# Patient Record
Sex: MALE | Race: WHITE | NOT HISPANIC OR LATINO | URBAN - METROPOLITAN AREA
[De-identification: names, ages, dates, MRNs, and addresses within clinical notes are randomized per-mention and may not be internally consistent; named-entity substitution may affect disease eponyms.]

---

## 2021-09-17 DIAGNOSIS — N40.0 BPH (BENIGN PROSTATIC HYPERPLASIA): Primary | ICD-10-CM

## 2021-09-21 ENCOUNTER — OFFICE VISIT (OUTPATIENT)
Dept: UROLOGY | Facility: CLINIC | Age: 71
End: 2021-09-21
Payer: MEDICARE

## 2021-09-21 VITALS — WEIGHT: 201 LBS | RESPIRATION RATE: 18 BRPM | HEIGHT: 75 IN | BODY MASS INDEX: 24.99 KG/M2

## 2021-09-21 DIAGNOSIS — Z80.42 FAMILY HISTORY OF PROSTATE CANCER IN FATHER: ICD-10-CM

## 2021-09-21 DIAGNOSIS — R39.11 BENIGN PROSTATIC HYPERPLASIA WITH URINARY HESITANCY: Primary | ICD-10-CM

## 2021-09-21 DIAGNOSIS — N40.1 BENIGN PROSTATIC HYPERPLASIA WITH URINARY HESITANCY: Primary | ICD-10-CM

## 2021-09-21 LAB — POC RESIDUAL URINE VOLUME: 35 ML (ref 0–100)

## 2021-09-21 PROCEDURE — 51798 POCT BLADDER SCAN: ICD-10-PCS | Mod: S$GLB,,, | Performed by: UROLOGY

## 2021-09-21 PROCEDURE — 99203 PR OFFICE/OUTPT VISIT, NEW, LEVL III, 30-44 MIN: ICD-10-PCS | Mod: S$GLB,,, | Performed by: UROLOGY

## 2021-09-21 PROCEDURE — 51798 US URINE CAPACITY MEASURE: CPT | Mod: S$GLB,,, | Performed by: UROLOGY

## 2021-09-21 PROCEDURE — 99203 OFFICE O/P NEW LOW 30 MIN: CPT | Mod: S$GLB,,, | Performed by: UROLOGY

## 2021-09-21 RX ORDER — UBIDECARENONE 30 MG
30 CAPSULE ORAL 3 TIMES DAILY
COMMUNITY

## 2021-09-21 RX ORDER — PANTOPRAZOLE SODIUM 40 MG/1
40 TABLET, DELAYED RELEASE ORAL DAILY
COMMUNITY
Start: 2021-07-03

## 2021-09-21 RX ORDER — TAMSULOSIN HYDROCHLORIDE 0.4 MG/1
1 CAPSULE ORAL DAILY
COMMUNITY
Start: 2021-09-08

## 2021-09-21 RX ORDER — DUTASTERIDE 0.5 MG/1
0.5 CAPSULE, LIQUID FILLED ORAL DAILY
COMMUNITY
Start: 2021-09-15 | End: 2021-09-21

## 2021-09-21 RX ORDER — AMOXICILLIN AND CLAVULANATE POTASSIUM 875; 125 MG/1; MG/1
1 TABLET, FILM COATED ORAL 2 TIMES DAILY
COMMUNITY
Start: 2021-09-14 | End: 2023-04-27

## 2021-09-21 RX ORDER — LEVOTHYROXINE SODIUM 25 UG/1
25 TABLET ORAL DAILY
COMMUNITY
Start: 2021-07-03

## 2021-09-21 RX ORDER — MULTIVITAMIN
1 TABLET ORAL DAILY
COMMUNITY

## 2022-03-25 ENCOUNTER — TELEPHONE (OUTPATIENT)
Dept: UROLOGY | Facility: CLINIC | Age: 72
End: 2022-03-25
Payer: MEDICARE

## 2022-03-25 NOTE — TELEPHONE ENCOUNTER
----- Message from Jaja Jernigan sent at 3/25/2022  8:14 AM CDT -----  Pt requests a call back to change lab work appt., Pt will elaborate , please call .[phone] (tbyw)

## 2022-04-04 ENCOUNTER — CLINICAL SUPPORT (OUTPATIENT)
Dept: UROLOGY | Facility: CLINIC | Age: 72
End: 2022-04-04
Payer: MEDICARE

## 2022-04-04 DIAGNOSIS — R39.11 BENIGN PROSTATIC HYPERPLASIA WITH URINARY HESITANCY: Primary | ICD-10-CM

## 2022-04-04 DIAGNOSIS — N40.1 BENIGN PROSTATIC HYPERPLASIA WITH URINARY HESITANCY: Primary | ICD-10-CM

## 2022-04-04 LAB — PSA, DIAGNOSTIC: 1.88 NG/ML (ref 0–4)

## 2022-04-07 ENCOUNTER — OFFICE VISIT (OUTPATIENT)
Dept: UROLOGY | Facility: CLINIC | Age: 72
End: 2022-04-07
Payer: MEDICARE

## 2022-04-07 VITALS
HEART RATE: 77 BPM | HEIGHT: 75 IN | BODY MASS INDEX: 24.99 KG/M2 | DIASTOLIC BLOOD PRESSURE: 67 MMHG | WEIGHT: 201 LBS | SYSTOLIC BLOOD PRESSURE: 142 MMHG | TEMPERATURE: 99 F

## 2022-04-07 DIAGNOSIS — Z12.5 PROSTATE CANCER SCREENING: Primary | ICD-10-CM

## 2022-04-07 PROCEDURE — 99214 OFFICE O/P EST MOD 30 MIN: CPT | Mod: S$GLB,,, | Performed by: UROLOGY

## 2022-04-07 PROCEDURE — 99214 PR OFFICE/OUTPT VISIT, EST, LEVL IV, 30-39 MIN: ICD-10-PCS | Mod: S$GLB,,, | Performed by: UROLOGY

## 2022-04-07 NOTE — PROGRESS NOTES
Subjective:       Patient ID: Sanjuanita Morris is a 71 y.o. male.    Chief Complaint: Other (PSA RESULTS)      HPI: 71 year old  male who presents for establishment of care for evaluation of BPH. He experienced a fall which preceded his urinary symptoms. He reported lower abdominal pain and rectal pain, he also was experiencing hesitancy, frequency, and urgency. He was placed on 2 antibiotics which he completed as prescribed. He underwent CT imaging which showed prostate enlargement. He was then started on tamsulosin/dutasteride. He presents today and states that symptoms have improved. He was not having any urological symptoms prior to the fall.        he is here for a 6 month PSA and states that he is doing much better his PSA at age 71 is 1.88  He does have a family history of prostate cancer in his father. His father  of prostate cancer per patient report. He was 85 years old at the time of death and his PSA resulted as high as the 100s.     He has a recent PSA resulted at 2.3 ng/mL when his urinary symptoms started. His normal PSA ranges around 0.8-1.4 ng/mL per his report.       He denies any urinary complaints today such as burning with urination or hematuria.    Past Medical History:   Past Medical History:   Diagnosis Date    Actinic keratosis     BPH with obstruction/lower urinary tract symptoms     Cervical disc disorder with radiculopathy     Dermatitis     Diastasis of muscle     Diverticulitis     ED (erectile dysfunction)     Family history of prostate cancer in father     Fracture of metatarsal     GERD (gastroesophageal reflux disease)     Hemangioma     History of UTI     HLD (hyperlipidemia)     Hypothyroidism     Osteoarthritis     Sciatica     Seborrheic dermatitis     Sleep apnea     Solitary pulmonary nodule     Sprain of rotator cuff     Superior glenoid labrum lesion of left shoulder        Past Surgical Historical:   Past Surgical History:   Procedure  Laterality Date    ANKLE SURGERY      COLONOSCOPY  2019    LEG SURGERY          Medications:   Medication List with Changes/Refills   Current Medications    AMOXICILLIN-CLAVULANATE 875-125MG (AUGMENTIN) 875-125 MG PER TABLET    Take 1 tablet by mouth 2 (two) times daily.    CO-ENZYME Q-10 30 MG CAPSULE    Take 30 mg by mouth 3 (three) times daily.    ERGOCALCIFEROL, VITAMIN D2, (VITAMIN D ORAL)    Take by mouth.    LEVOTHYROXINE (SYNTHROID) 25 MCG TABLET    Take 25 mcg by mouth once daily.    MULTIVITAMIN (ONE DAILY MULTIVITAMIN) PER TABLET    Take 1 tablet by mouth once daily.    PANTOPRAZOLE (PROTONIX) 40 MG TABLET    Take 40 mg by mouth once daily.    TAMSULOSIN (FLOMAX) 0.4 MG CAP    Take 1 capsule by mouth once daily.    ZINC ORAL    Take by mouth.        Past Social History:   Social History     Socioeconomic History    Marital status:    Tobacco Use    Smoking status: Never Smoker    Smokeless tobacco: Never Used   Substance and Sexual Activity    Alcohol use: Yes       Allergies:   Review of patient's allergies indicates:   Allergen Reactions    Aminoglycosides      -mycins        Family History:   Family History   Problem Relation Age of Onset    Prostate cancer Father     COPD Mother         Review of Systems:  Review of Systems    Physical Exam:  Physical Exam    Assessment/Plan:       Problem List Items Addressed This Visit    None             prostate cancer screening:  Patient's PSA is 1.88 is doing very well has no issues return to clinic as needed

## 2023-04-03 ENCOUNTER — TELEPHONE (OUTPATIENT)
Dept: UROLOGY | Facility: CLINIC | Age: 73
End: 2023-04-03
Payer: MEDICARE

## 2023-04-03 NOTE — TELEPHONE ENCOUNTER
Elsy rescheduled.     ----- Message from Devorah Ceja sent at 4/3/2023 10:14 AM CDT -----  Contact: pt  Pt calling to reschedule appt for 4/10/1023 and he can be reached at [phone].    Thanks,

## 2023-04-14 ENCOUNTER — TELEPHONE (OUTPATIENT)
Dept: UROLOGY | Facility: CLINIC | Age: 73
End: 2023-04-14
Payer: MEDICARE

## 2023-04-14 DIAGNOSIS — R39.11 BENIGN PROSTATIC HYPERPLASIA WITH URINARY HESITANCY: ICD-10-CM

## 2023-04-14 DIAGNOSIS — N40.1 BENIGN PROSTATIC HYPERPLASIA WITH URINARY HESITANCY: ICD-10-CM

## 2023-04-14 DIAGNOSIS — Z12.5 PROSTATE CANCER SCREENING: Primary | ICD-10-CM

## 2023-04-14 NOTE — TELEPHONE ENCOUNTER
----- Message from Peggy Browne sent at 4/14/2023  9:17 AM CDT -----  Contact: self  Type:  Patient Returning Call    Who Called:Sanjuanita Morris   Who Left Message for Patient: Sofia Das  Does the patient know what this is regarding?: Yes  Would the patient rather a call back or a response via [facility]? Call back   Best Call Back Number:[phone]   Additional Information: I tried the clinic ext, but no answer

## 2023-04-14 NOTE — TELEPHONE ENCOUNTER
----- Message from Cheri Diana sent at 4/14/2023  8:26 AM CDT -----  Regarding: pt advice  Contact: pt  Pt is calling to speak to nurse regarding his appt for the lab draw. Please call back at [phone]//thank you acc

## 2023-04-14 NOTE — TELEPHONE ENCOUNTER
Pt would like to have his blood drawn at the clinical pathology lab in [place] since where he lives. Order faxed [phone]

## 2023-04-26 ENCOUNTER — TELEPHONE (OUTPATIENT)
Dept: UROLOGY | Facility: CLINIC | Age: 73
End: 2023-04-26
Payer: MEDICARE

## 2023-04-26 NOTE — TELEPHONE ENCOUNTER
----- Message from Elizabeth Proctor sent at 4/26/2023 11:38 AM CDT -----  Patient returning call......

## 2023-04-27 ENCOUNTER — OFFICE VISIT (OUTPATIENT)
Dept: UROLOGY | Facility: CLINIC | Age: 73
End: 2023-04-27
Payer: MEDICARE

## 2023-04-27 DIAGNOSIS — Z12.5 PROSTATE CANCER SCREENING: ICD-10-CM

## 2023-04-27 DIAGNOSIS — N52.9 ERECTILE DYSFUNCTION, UNSPECIFIED ERECTILE DYSFUNCTION TYPE: ICD-10-CM

## 2023-04-27 DIAGNOSIS — N13.8 BPH WITH OBSTRUCTION/LOWER URINARY TRACT SYMPTOMS: Primary | ICD-10-CM

## 2023-04-27 DIAGNOSIS — N40.1 BPH WITH OBSTRUCTION/LOWER URINARY TRACT SYMPTOMS: Primary | ICD-10-CM

## 2023-04-27 PROCEDURE — 99214 PR OFFICE/OUTPT VISIT, EST, LEVL IV, 30-39 MIN: ICD-10-PCS | Mod: S$GLB,,, | Performed by: UROLOGY

## 2023-04-27 PROCEDURE — 99214 OFFICE O/P EST MOD 30 MIN: CPT | Mod: S$GLB,,, | Performed by: UROLOGY

## 2023-04-27 NOTE — PROGRESS NOTES
Subjective:       Patient ID: Sanjuanita Morris is a 72 y.o. male.    Chief Complaint: 6 month psa results      HPI:  72-year-old male here for prostate cancer screening PSA is 1.88 patient also has erectile dysfunction does have a family history of prostate cancer in his father.  Father  with a PSA in the 600s.  Patient also has erectile dysfunction for which he uses Viagra    Past Medical History:   Past Medical History:   Diagnosis Date    Actinic keratosis     BPH with obstruction/lower urinary tract symptoms     Cervical disc disorder with radiculopathy     Dermatitis     Diastasis of muscle     Diverticulitis     ED (erectile dysfunction)     Family history of prostate cancer in father     Fracture of metatarsal     GERD (gastroesophageal reflux disease)     Hemangioma     History of UTI     HLD (hyperlipidemia)     Hypothyroidism     Osteoarthritis     Sciatica     Seborrheic dermatitis     Sleep apnea     Solitary pulmonary nodule     Sprain of rotator cuff     Superior glenoid labrum lesion of left shoulder        Past Surgical Historical:   Past Surgical History:   Procedure Laterality Date    ANKLE SURGERY      COLONOSCOPY  2019    LEG SURGERY          Medications:   Medication List with Changes/Refills   Current Medications    CO-ENZYME Q-10 30 MG CAPSULE    Take 30 mg by mouth 3 (three) times daily.    ERGOCALCIFEROL, VITAMIN D2, (VITAMIN D ORAL)    Take by mouth.    LEVOTHYROXINE (SYNTHROID) 25 MCG TABLET    Take 25 mcg by mouth once daily.    MULTIVITAMIN (ONE DAILY MULTIVITAMIN) PER TABLET    Take 1 tablet by mouth once daily.    PANTOPRAZOLE (PROTONIX) 40 MG TABLET    Take 40 mg by mouth once daily.    TAMSULOSIN (FLOMAX) 0.4 MG CAP    Take 1 capsule by mouth once daily.    ZINC ORAL    Take by mouth.   Discontinued Medications    AMOXICILLIN-CLAVULANATE 875-125MG (AUGMENTIN) 875-125 MG PER TABLET    Take 1 tablet by mouth 2 (two) times daily.        Past Social History:   Social History      Socioeconomic History    Marital status:    Tobacco Use    Smoking status: Never    Smokeless tobacco: Never   Substance and Sexual Activity    Alcohol use: Yes       Allergies:   Review of patient's allergies indicates:   Allergen Reactions    Aminoglycosides      -mycins        Family History:   Family History   Problem Relation Age of Onset    Prostate cancer Father     COPD Mother         Review of Systems:  Review of Systems   Constitutional:  Negative for activity change and appetite change.   HENT:  Negative for congestion and dental problem.    Eyes:  Negative for visual disturbance.   Respiratory:  Negative for chest tightness and shortness of breath.    Cardiovascular:  Negative for chest pain.   Gastrointestinal:  Negative for abdominal distention and abdominal pain.   Endocrine: Negative for polyuria.   Genitourinary:  Negative for difficulty urinating, dysuria, flank pain, frequency, hematuria, penile discharge, penile pain, penile swelling, scrotal swelling, testicular pain and urgency.   Musculoskeletal:  Negative for back pain and neck pain.   Skin:  Negative for color change.   Allergic/Immunologic: Positive for immunocompromised state.   Neurological:  Negative for dizziness.   Hematological:  Negative for adenopathy.   Psychiatric/Behavioral:  Negative for agitation, behavioral problems and confusion.      Physical Exam:  Physical Exam  Constitutional:       General: He is not in acute distress.     Appearance: He is well-developed.   HENT:      Head: Normocephalic and atraumatic.      Nose: Nose normal.   Eyes:      General: No scleral icterus.     Conjunctiva/sclera: Conjunctivae normal.      Pupils: Pupils are equal, round, and reactive to light.   Neck:      Thyroid: No thyromegaly.      Trachea: No tracheal deviation.   Cardiovascular:      Rate and Rhythm: Normal rate and regular rhythm.      Heart sounds: Normal heart sounds.   Pulmonary:      Effort: Pulmonary effort is normal.  No respiratory distress.      Breath sounds: Normal breath sounds. No wheezing or rales.   Abdominal:      General: Bowel sounds are normal. There is no distension.      Palpations: Abdomen is soft.      Tenderness: There is no abdominal tenderness. There is no guarding or rebound.   Genitourinary:     Penis: Normal. No tenderness.       Prostate: Normal.   Musculoskeletal:         General: No deformity. Normal range of motion.      Cervical back: Neck supple.   Lymphadenopathy:      Cervical: No cervical adenopathy.   Skin:     General: Skin is warm and dry.      Findings: No erythema or rash.   Neurological:      Mental Status: He is alert and oriented to person, place, and time.      Cranial Nerves: No cranial nerve deficit.   Psychiatric:         Behavior: Behavior normal.       Assessment/Plan:       Problem List Items Addressed This Visit    None  Visit Diagnoses       BPH with obstruction/lower urinary tract symptoms    -  Primary    Erectile dysfunction, unspecified erectile dysfunction type        Prostate cancer screening                   BPH:  We will continue the patient on Flomax and dutasteride    Prostate cancer screening patient's PSA is acceptable for his age     Erectile dysfunction will refill his Viagra as needed

## 2024-04-23 ENCOUNTER — TELEPHONE (OUTPATIENT)
Dept: UROLOGY | Facility: CLINIC | Age: 74
End: 2024-04-23
Payer: MEDICARE

## 2024-04-23 DIAGNOSIS — Z12.5 PROSTATE CANCER SCREENING: Primary | ICD-10-CM

## 2024-04-23 DIAGNOSIS — N40.1 BENIGN PROSTATIC HYPERPLASIA WITH LOWER URINARY TRACT SYMPTOMS, SYMPTOM DETAILS UNSPECIFIED: ICD-10-CM

## 2024-04-23 NOTE — TELEPHONE ENCOUNTER
----- Message from Jamila Aragon sent at 4/23/2024  9:57 AM CDT -----  Contact: Patient  Patient called to consult with nurse or staff regarding staff regarding his upcoming blood work. He states he has questions and would like a call back. Patient can be reached at [phone]. Thanks/MR

## 2024-04-25 ENCOUNTER — TELEPHONE (OUTPATIENT)
Dept: UROLOGY | Facility: CLINIC | Age: 74
End: 2024-04-25
Payer: MEDICARE

## 2024-04-25 NOTE — TELEPHONE ENCOUNTER
Left a voicemail for patient to return call in regards to clinical notes. Provided call back number.       ----- Message from Cally Garcia sent at 4/25/2024 11:01 AM CDT -----  Contact: Sanjuanita Redmond is calling in regards to the clinical notes that was fax over Clinical Pathology.please call back at .[phone]        Thanks  LUCIAN

## 2024-04-26 ENCOUNTER — TELEPHONE (OUTPATIENT)
Dept: UROLOGY | Facility: CLINIC | Age: 74
End: 2024-04-26
Payer: MEDICARE

## 2024-04-26 NOTE — TELEPHONE ENCOUNTER
Spoke with patient in regards to PSA results. Verified that we have not received the results. Patient states he had clinical pathology fax over the results yesterday. Provided clinical pathology number for me to reach out and confirm correct fax number. Patient states, I will follow up on Monday with them to see if they faxed it, but I can also go by and request them to give me a copy. I advised to patient I will contact their clinic, but the office may already by closed. Patient verbalized understanding and will contact our clinic and the pathology clinic in [place] on Monday.       Contacted clinical pathology and the office was closed. Phone number [phone].

## 2024-04-26 NOTE — TELEPHONE ENCOUNTER
Left a voicemail for patient to return call in regards to clinic notes.       ----- Message from Elsy Mario sent at 4/26/2024  8:50 AM CDT -----  Contact: pt  Type:  Patient Returning Call    Who Called:Sanjuanita Morris   Who Left Message for Patient:n/a  Does the patient know what this is regarding?:[facility] for pt call back  Would the patient rather a call back or a response via [facility]? call  Best Call Back Number:[phone]     Additional Information: n/a

## 2024-04-29 ENCOUNTER — TELEPHONE (OUTPATIENT)
Dept: UROLOGY | Facility: CLINIC | Age: 74
End: 2024-04-29
Payer: MEDICARE

## 2024-04-29 NOTE — TELEPHONE ENCOUNTER
Contacted, confirmed labs were received. BJ    ----- Message from Bridgette Burgos sent at 4/29/2024 11:44 AM CDT -----  Contact: Ary with  Lab  Type: Staff Message    Caller: Ary with  Lab  Call Back Number: [phone]  Nature of the Call: pt is at the clinic and nurse needing to speak with someone in office.  Additional Information: angie

## 2024-04-30 ENCOUNTER — OFFICE VISIT (OUTPATIENT)
Dept: UROLOGY | Facility: CLINIC | Age: 74
End: 2024-04-30
Payer: MEDICARE

## 2024-04-30 VITALS
OXYGEN SATURATION: 99 % | DIASTOLIC BLOOD PRESSURE: 79 MMHG | HEART RATE: 51 BPM | BODY MASS INDEX: 24.25 KG/M2 | HEIGHT: 75 IN | SYSTOLIC BLOOD PRESSURE: 162 MMHG | WEIGHT: 195 LBS

## 2024-04-30 DIAGNOSIS — N52.9 ERECTILE DYSFUNCTION, UNSPECIFIED ERECTILE DYSFUNCTION TYPE: ICD-10-CM

## 2024-04-30 DIAGNOSIS — Z12.5 PROSTATE CANCER SCREENING: Primary | ICD-10-CM

## 2024-04-30 DIAGNOSIS — N40.1 BENIGN PROSTATIC HYPERPLASIA WITH LOWER URINARY TRACT SYMPTOMS, SYMPTOM DETAILS UNSPECIFIED: ICD-10-CM

## 2024-04-30 PROCEDURE — 3077F SYST BP >= 140 MM HG: CPT | Mod: CPTII,S$GLB,, | Performed by: UROLOGY

## 2024-04-30 PROCEDURE — 1101F PT FALLS ASSESS-DOCD LE1/YR: CPT | Mod: CPTII,S$GLB,, | Performed by: UROLOGY

## 2024-04-30 PROCEDURE — 3078F DIAST BP <80 MM HG: CPT | Mod: CPTII,S$GLB,, | Performed by: UROLOGY

## 2024-04-30 PROCEDURE — 3288F FALL RISK ASSESSMENT DOCD: CPT | Mod: CPTII,S$GLB,, | Performed by: UROLOGY

## 2024-04-30 PROCEDURE — 1159F MED LIST DOCD IN RCRD: CPT | Mod: CPTII,S$GLB,, | Performed by: UROLOGY

## 2024-04-30 PROCEDURE — 99214 OFFICE O/P EST MOD 30 MIN: CPT | Mod: S$GLB,,, | Performed by: UROLOGY

## 2024-04-30 PROCEDURE — 1126F AMNT PAIN NOTED NONE PRSNT: CPT | Mod: CPTII,S$GLB,, | Performed by: UROLOGY

## 2024-04-30 RX ORDER — CELECOXIB 200 MG/1
200 CAPSULE ORAL DAILY
COMMUNITY
Start: 2024-04-03

## 2024-04-30 RX ORDER — SILDENAFIL 100 MG/1
100 TABLET, FILM COATED ORAL DAILY PRN
COMMUNITY

## 2024-04-30 NOTE — PROGRESS NOTES
Subjective:       Patient ID: Sanjuanita Morris is a 73 y.o. male.    Chief Complaint: No chief complaint on file.      HPI:  73-year-old male here for prostate cancer screening PSA is 2.66. Patient also has erectile dysfunction for which he uses Viagra. Does have a family history of prostate cancer in his father.  Father  with a PSA in the 600s.      2024   2.66  2022   1.880    Past Medical History:   Past Medical History:   Diagnosis Date    Actinic keratosis     BPH with obstruction/lower urinary tract symptoms     Cervical disc disorder with radiculopathy     Dermatitis     Diastasis of muscle     Diverticulitis     ED (erectile dysfunction)     Family history of prostate cancer in father     Fracture of metatarsal     GERD (gastroesophageal reflux disease)     Hemangioma     History of UTI     HLD (hyperlipidemia)     Hypothyroidism     Osteoarthritis     Sciatica     Seborrheic dermatitis     Sleep apnea     Solitary pulmonary nodule     Sprain of rotator cuff     Superior glenoid labrum lesion of left shoulder        Past Surgical Historical:   Past Surgical History:   Procedure Laterality Date    ANKLE SURGERY      COLONOSCOPY  2019    LEG SURGERY          Medications:   Medication List with Changes/Refills   Current Medications    CELECOXIB (CELEBREX) 200 MG CAPSULE    Take 200 mg by mouth once daily.    CO-ENZYME Q-10 30 MG CAPSULE    Take 30 mg by mouth 3 (three) times daily.    ERGOCALCIFEROL, VITAMIN D2, (VITAMIN D ORAL)    Take by mouth.    LEVOTHYROXINE (SYNTHROID) 25 MCG TABLET    Take 25 mcg by mouth once daily.    MULTIVITAMIN (ONE DAILY MULTIVITAMIN) PER TABLET    Take 1 tablet by mouth once daily.    PANTOPRAZOLE (PROTONIX) 40 MG TABLET    Take 40 mg by mouth once daily.    SILDENAFIL (VIAGRA) 100 MG TABLET    Take 100 mg by mouth daily as needed for Erectile Dysfunction.    TAMSULOSIN (FLOMAX) 0.4 MG CAP    Take 1 capsule by mouth once daily.    ZINC ORAL    Take by mouth.         Past Social History:   Social History     Socioeconomic History    Marital status:    Tobacco Use    Smoking status: Never    Smokeless tobacco: Never   Substance and Sexual Activity    Alcohol use: Yes       Allergies:   Review of patient's allergies indicates:   Allergen Reactions    Aminoglycosides      -mycins    Tetanus vaccines and toxoid         Family History:   Family History   Problem Relation Name Age of Onset    Prostate cancer Father      COPD Mother          Review of Systems:  Review of Systems   Constitutional:  Negative for activity change and appetite change.   HENT:  Negative for congestion and dental problem.    Eyes:  Negative for visual disturbance.   Respiratory:  Negative for chest tightness and shortness of breath.    Cardiovascular:  Negative for chest pain.   Gastrointestinal:  Negative for abdominal distention and abdominal pain.   Endocrine: Negative for polyuria.   Genitourinary:  Negative for difficulty urinating, dysuria, flank pain, frequency, hematuria, penile discharge, penile pain, penile swelling, scrotal swelling, testicular pain and urgency.   Musculoskeletal:  Negative for back pain and neck pain.   Skin:  Negative for color change.   Allergic/Immunologic: Positive for immunocompromised state.   Neurological:  Negative for dizziness.   Hematological:  Negative for adenopathy.   Psychiatric/Behavioral:  Negative for agitation, behavioral problems and confusion.        Physical Exam:  Physical Exam  Constitutional:       General: He is not in acute distress.     Appearance: He is well-developed.   HENT:      Head: Normocephalic and atraumatic.      Nose: Nose normal.   Eyes:      General: No scleral icterus.     Conjunctiva/sclera: Conjunctivae normal.      Pupils: Pupils are equal, round, and reactive to light.   Neck:      Thyroid: No thyromegaly.      Trachea: No tracheal deviation.   Cardiovascular:      Rate and Rhythm: Normal rate and regular rhythm.      Heart  sounds: Normal heart sounds.   Pulmonary:      Effort: Pulmonary effort is normal. No respiratory distress.      Breath sounds: Normal breath sounds. No wheezing or rales.   Abdominal:      General: Bowel sounds are normal. There is no distension.      Palpations: Abdomen is soft.      Tenderness: There is no abdominal tenderness. There is no guarding or rebound.   Genitourinary:     Penis: Normal. No tenderness.       Prostate: Normal.   Musculoskeletal:         General: No deformity. Normal range of motion.      Cervical back: Neck supple.   Lymphadenopathy:      Cervical: No cervical adenopathy.   Skin:     General: Skin is warm and dry.      Findings: No erythema or rash.   Neurological:      Mental Status: He is alert and oriented to person, place, and time.      Cranial Nerves: No cranial nerve deficit.   Psychiatric:         Behavior: Behavior normal.         Assessment/Plan:     BPH:  We will continue the patient on Flomax and dutasteride    Prostate cancer screening patient's PSA is acceptable for his age. Follow up in 6 months with PSA due to family history    Erectile dysfunction will refill his Viagra as needed    I, Dr. Yeyo Myers have seen and personally evaluated the patient. I have formulated the plan reviewed all pertinent imaging and clinical data.  I agree with the nurse practitioner's assessment, and I have personally formulated the plan for this patient's care as described by the midlevel.  Problem List Items Addressed This Visit    None

## 2024-11-04 ENCOUNTER — TELEPHONE (OUTPATIENT)
Dept: UROLOGY | Facility: CLINIC | Age: 74
End: 2024-11-04
Payer: MEDICARE

## 2024-11-04 DIAGNOSIS — N40.1 BENIGN PROSTATIC HYPERPLASIA WITH LOWER URINARY TRACT SYMPTOMS, SYMPTOM DETAILS UNSPECIFIED: ICD-10-CM

## 2024-11-04 DIAGNOSIS — Z12.5 PROSTATE CANCER SCREENING: Primary | ICD-10-CM

## 2024-11-04 NOTE — TELEPHONE ENCOUNTER
Contacted pt, confirmed where he would like to have labs drawn. Order placed and faxed. BJJACQUE    ----- Message from Philip sent at 11/4/2024  9:41 AM CST -----  Contact: Sanjuanita  Patient states that he will like to speak with  nurse due to he has an upcoming visit and will like to have orders put in for his blood work in [place]. Call back is [phone]

## 2024-11-14 ENCOUNTER — OFFICE VISIT (OUTPATIENT)
Dept: UROLOGY | Facility: CLINIC | Age: 74
End: 2024-11-14
Payer: MEDICARE

## 2024-11-14 VITALS
HEART RATE: 77 BPM | WEIGHT: 195 LBS | HEIGHT: 75 IN | BODY MASS INDEX: 24.25 KG/M2 | DIASTOLIC BLOOD PRESSURE: 73 MMHG | SYSTOLIC BLOOD PRESSURE: 145 MMHG

## 2024-11-14 DIAGNOSIS — Z12.5 PROSTATE CANCER SCREENING: ICD-10-CM

## 2024-11-14 DIAGNOSIS — N40.1 BENIGN PROSTATIC HYPERPLASIA WITH LOWER URINARY TRACT SYMPTOMS, SYMPTOM DETAILS UNSPECIFIED: Primary | ICD-10-CM

## 2024-11-14 PROCEDURE — 3077F SYST BP >= 140 MM HG: CPT | Mod: CPTII,,, | Performed by: UROLOGY

## 2024-11-14 PROCEDURE — 3078F DIAST BP <80 MM HG: CPT | Mod: CPTII,,, | Performed by: UROLOGY

## 2024-11-14 PROCEDURE — 99214 OFFICE O/P EST MOD 30 MIN: CPT | Mod: ,,, | Performed by: UROLOGY

## 2024-11-14 PROCEDURE — 1159F MED LIST DOCD IN RCRD: CPT | Mod: CPTII,,, | Performed by: UROLOGY

## 2024-11-14 PROCEDURE — 1101F PT FALLS ASSESS-DOCD LE1/YR: CPT | Mod: CPTII,,, | Performed by: UROLOGY

## 2024-11-14 PROCEDURE — 3288F FALL RISK ASSESSMENT DOCD: CPT | Mod: CPTII,,, | Performed by: UROLOGY

## 2024-11-14 NOTE — PROGRESS NOTES
Subjective:       Patient ID: Sanjuanita Morris is a 74 y.o. male.    Chief Complaint: No chief complaint on file.      HPI:  73-year-old male here for prostate cancer screening PSA is 2.66. Patient also has erectile dysfunction for which he uses Viagra. Does have a family history of prostate cancer in his father.  Father  with a PSA in the 600s.      2024   2.56  2024   2.66  2022   1.880    Past Medical History:   Past Medical History:   Diagnosis Date    Actinic keratosis     BPH with obstruction/lower urinary tract symptoms     Cervical disc disorder with radiculopathy     Dermatitis     Diastasis of muscle     Diverticulitis     ED (erectile dysfunction)     Family history of prostate cancer in father     Fracture of metatarsal     GERD (gastroesophageal reflux disease)     Hemangioma     History of UTI     HLD (hyperlipidemia)     Hypothyroidism     Osteoarthritis     Sciatica     Seborrheic dermatitis     Sleep apnea     Solitary pulmonary nodule     Sprain of rotator cuff     Superior glenoid labrum lesion of left shoulder        Past Surgical Historical:   Past Surgical History:   Procedure Laterality Date    ANKLE SURGERY      COLONOSCOPY  2019    LEG SURGERY          Medications:   Medication List with Changes/Refills   Current Medications    CELECOXIB (CELEBREX) 200 MG CAPSULE    Take 200 mg by mouth once daily.    CO-ENZYME Q-10 30 MG CAPSULE    Take 30 mg by mouth 3 (three) times daily.    ERGOCALCIFEROL, VITAMIN D2, (VITAMIN D ORAL)    Take by mouth.    LEVOTHYROXINE (SYNTHROID) 25 MCG TABLET    Take 25 mcg by mouth once daily.    MULTIVITAMIN (ONE DAILY MULTIVITAMIN) PER TABLET    Take 1 tablet by mouth once daily.    PANTOPRAZOLE (PROTONIX) 40 MG TABLET    Take 40 mg by mouth once daily.    SILDENAFIL (VIAGRA) 100 MG TABLET    Take 100 mg by mouth daily as needed for Erectile Dysfunction.    TAMSULOSIN (FLOMAX) 0.4 MG CAP    Take 1 capsule by mouth once daily.    ZINC ORAL    Take  by mouth.        Past Social History:   Social History     Socioeconomic History    Marital status:    Tobacco Use    Smoking status: Never    Smokeless tobacco: Never   Substance and Sexual Activity    Alcohol use: Yes       Allergies:   Review of patient's allergies indicates:   Allergen Reactions    Aminoglycosides      -mycins    Tetanus vaccines and toxoid         Family History:   Family History   Problem Relation Name Age of Onset    Prostate cancer Father      COPD Mother          Review of Systems:  Review of Systems   Constitutional:  Negative for activity change and appetite change.   HENT:  Negative for congestion and dental problem.    Eyes:  Negative for visual disturbance.   Respiratory:  Negative for chest tightness and shortness of breath.    Cardiovascular:  Negative for chest pain.   Gastrointestinal:  Negative for abdominal distention and abdominal pain.   Endocrine: Negative for polyuria.   Genitourinary:  Negative for difficulty urinating, dysuria, flank pain, frequency, hematuria, penile discharge, penile pain, penile swelling, scrotal swelling, testicular pain and urgency.   Musculoskeletal:  Negative for back pain and neck pain.   Skin:  Negative for color change.   Allergic/Immunologic: Positive for immunocompromised state.   Neurological:  Negative for dizziness.   Hematological:  Negative for adenopathy.   Psychiatric/Behavioral:  Negative for agitation, behavioral problems and confusion.        Physical Exam:  Physical Exam  Constitutional:       General: He is not in acute distress.     Appearance: He is well-developed.   HENT:      Head: Normocephalic and atraumatic.      Nose: Nose normal.   Eyes:      General: No scleral icterus.     Conjunctiva/sclera: Conjunctivae normal.      Pupils: Pupils are equal, round, and reactive to light.   Neck:      Thyroid: No thyromegaly.      Trachea: No tracheal deviation.   Cardiovascular:      Rate and Rhythm: Normal rate and regular  rhythm.      Heart sounds: Normal heart sounds.   Pulmonary:      Effort: Pulmonary effort is normal. No respiratory distress.      Breath sounds: Normal breath sounds. No wheezing or rales.   Abdominal:      General: Bowel sounds are normal. There is no distension.      Palpations: Abdomen is soft.      Tenderness: There is no abdominal tenderness. There is no guarding or rebound.   Genitourinary:     Penis: Normal. No tenderness.       Prostate: Normal.   Musculoskeletal:         General: No deformity. Normal range of motion.      Cervical back: Neck supple.   Lymphadenopathy:      Cervical: No cervical adenopathy.   Skin:     General: Skin is warm and dry.      Findings: No erythema or rash.   Neurological:      Mental Status: He is alert and oriented to person, place, and time.      Cranial Nerves: No cranial nerve deficit.   Psychiatric:         Behavior: Behavior normal.         Assessment/Plan:     BPH:  We will continue the patient on Flomax and dutasteride    Prostate cancer screening patient's PSA is acceptable for his age. Follow up in 6 months with PSA due to family history    Erectile dysfunction will refill his Viagra as needed    I, Dr. Yeyo Myers have seen and personally evaluated the patient. I have formulated the plan reviewed all pertinent imaging and clinical data.  I agree with the nurse practitioner's assessment, and I have personally formulated the plan for this patient's care as described by the midlevel.  Problem List Items Addressed This Visit    None

## 2025-05-15 ENCOUNTER — TELEPHONE (OUTPATIENT)
Dept: UROLOGY | Facility: CLINIC | Age: 75
End: 2025-05-15
Payer: MEDICARE

## 2025-05-15 DIAGNOSIS — N40.1 BENIGN PROSTATIC HYPERPLASIA WITH LOWER URINARY TRACT SYMPTOMS, SYMPTOM DETAILS UNSPECIFIED: Primary | ICD-10-CM

## 2025-05-15 NOTE — TELEPHONE ENCOUNTER
Pt requesting PSA lab orders to be sent to [facility]. Informed pt we will send orders, will need PSA results faxed back to clinic. Verbalized understanding.     ----- Message from Haley sent at 5/15/2025 12:25 PM CDT -----  Contact: TIMOTHY DONOHUE [[id number]]  .Type:  Patient Requesting CallWho Called:TIMOTHY DONOHUE [[id number]]Does the patient know what this is regarding?:pt is calling to speak with nurseWould the patient rather a call back or a response via [facility]? [facility]:.[phone] Information:

## 2025-05-20 ENCOUNTER — TELEPHONE (OUTPATIENT)
Dept: UROLOGY | Facility: CLINIC | Age: 75
End: 2025-05-20
Payer: MEDICARE

## 2025-05-20 NOTE — TELEPHONE ENCOUNTER
Confirmed with pt we have received the PSA results via fax. Pt verbalized understanding.MAXIMILIANO    Copied from CRM #[id number]. Topic: General Inquiry - Patient Advice  >> May 20, 2025  9:07 AM Maeve wrote:  Type:  Needs Medical Advice    Who Called: pt  Symptoms (please be specific): na   How long has patient had these symptoms:  na  Pharmacy name and phone #:  na  Would the patient rather a call back or a response via [facility]? call  Best Call Back Number: [phone]  Additional Information: requesting a call to check on the status of the fax that was supposed to be sent from clinical pathology.

## 2025-05-22 ENCOUNTER — OFFICE VISIT (OUTPATIENT)
Dept: UROLOGY | Facility: CLINIC | Age: 75
End: 2025-05-22
Payer: MEDICARE

## 2025-05-22 VITALS
DIASTOLIC BLOOD PRESSURE: 78 MMHG | WEIGHT: 198.69 LBS | BODY MASS INDEX: 24.7 KG/M2 | SYSTOLIC BLOOD PRESSURE: 124 MMHG | HEIGHT: 75 IN | HEART RATE: 63 BPM | OXYGEN SATURATION: 98 %

## 2025-05-22 DIAGNOSIS — Z12.5 PROSTATE CANCER SCREENING: ICD-10-CM

## 2025-05-22 DIAGNOSIS — N40.1 BENIGN PROSTATIC HYPERPLASIA WITH LOWER URINARY TRACT SYMPTOMS, SYMPTOM DETAILS UNSPECIFIED: Primary | ICD-10-CM

## 2025-05-22 DIAGNOSIS — Z80.42 FAMILY HISTORY OF PROSTATE CANCER: ICD-10-CM

## 2025-05-22 PROCEDURE — 3288F FALL RISK ASSESSMENT DOCD: CPT | Mod: CPTII,,, | Performed by: UROLOGY

## 2025-05-22 PROCEDURE — 99214 OFFICE O/P EST MOD 30 MIN: CPT | Mod: S$PBB,,, | Performed by: UROLOGY

## 2025-05-22 PROCEDURE — 1101F PT FALLS ASSESS-DOCD LE1/YR: CPT | Mod: CPTII,,, | Performed by: UROLOGY

## 2025-05-22 PROCEDURE — 3074F SYST BP LT 130 MM HG: CPT | Mod: CPTII,,, | Performed by: UROLOGY

## 2025-05-22 PROCEDURE — 1159F MED LIST DOCD IN RCRD: CPT | Mod: CPTII,,, | Performed by: UROLOGY

## 2025-05-22 PROCEDURE — 3078F DIAST BP <80 MM HG: CPT | Mod: CPTII,,, | Performed by: UROLOGY

## 2025-05-22 NOTE — PROGRESS NOTES
Subjective:       Patient ID: Sanjuanita Morris is a 74 y.o. male.    Chief Complaint: No chief complaint on file.      HPI:  74-year-old male here for prostate cancer screening PSA is 2.17. Patient also has erectile dysfunction for which he uses Viagra. Does have a family history of prostate cancer in his father.  Father  with a PSA in the 600s.      2025   2.17  2024   2.56  2024   2.66  2022   1.880    Past Medical History:   Past Medical History:   Diagnosis Date    Actinic keratosis     BPH with obstruction/lower urinary tract symptoms     Cervical disc disorder with radiculopathy     Dermatitis     Diastasis of muscle     Diverticulitis     ED (erectile dysfunction)     Family history of prostate cancer in father     Fracture of metatarsal     GERD (gastroesophageal reflux disease)     Hemangioma     History of UTI     HLD (hyperlipidemia)     Hypothyroidism     Osteoarthritis     Sciatica     Seborrheic dermatitis     Sleep apnea     Solitary pulmonary nodule     Sprain of rotator cuff     Superior glenoid labrum lesion of left shoulder        Past Surgical Historical:   Past Surgical History:   Procedure Laterality Date    ANKLE SURGERY      COLONOSCOPY  2019    LEG SURGERY          Medications:   Medication List with Changes/Refills   Current Medications    CELECOXIB (CELEBREX) 200 MG CAPSULE    Take 200 mg by mouth once daily.    CO-ENZYME Q-10 30 MG CAPSULE    Take 30 mg by mouth 3 (three) times daily.    ERGOCALCIFEROL, VITAMIN D2, (VITAMIN D ORAL)    Take by mouth.    LEVOTHYROXINE (SYNTHROID) 25 MCG TABLET    Take 25 mcg by mouth once daily.    MULTIVITAMIN (ONE DAILY MULTIVITAMIN) PER TABLET    Take 1 tablet by mouth once daily.    PANTOPRAZOLE (PROTONIX) 40 MG TABLET    Take 40 mg by mouth once daily.    SILDENAFIL (VIAGRA) 100 MG TABLET    Take 100 mg by mouth daily as needed for Erectile Dysfunction.    TAMSULOSIN (FLOMAX) 0.4 MG CAP    Take 1 capsule by mouth once daily.     ZINC ORAL    Take 50 mg by mouth.        Past Social History:   Social History     Socioeconomic History    Marital status:    Tobacco Use    Smoking status: Never    Smokeless tobacco: Never   Substance and Sexual Activity    Alcohol use: Yes       Allergies:   Review of patient's allergies indicates:   Allergen Reactions    Aminoglycosides      -mycins    Tetanus vaccines and toxoid         Family History:   Family History   Problem Relation Name Age of Onset    Prostate cancer Father      COPD Mother      Other (cardiac amyloidosis) Sister          Review of Systems:  Review of Systems   Constitutional:  Negative for activity change and appetite change.   HENT:  Negative for congestion and dental problem.    Eyes:  Negative for visual disturbance.   Respiratory:  Negative for chest tightness and shortness of breath.    Cardiovascular:  Negative for chest pain.   Gastrointestinal:  Negative for abdominal distention and abdominal pain.   Endocrine: Negative for polyuria.   Genitourinary:  Negative for difficulty urinating, dysuria, flank pain, frequency, hematuria, penile discharge, penile pain, penile swelling, scrotal swelling, testicular pain and urgency.   Musculoskeletal:  Negative for back pain and neck pain.   Skin:  Negative for color change.   Allergic/Immunologic: Positive for immunocompromised state.   Neurological:  Negative for dizziness.   Hematological:  Negative for adenopathy.   Psychiatric/Behavioral:  Negative for agitation, behavioral problems and confusion.        Physical Exam:  Physical Exam  Constitutional:       General: He is not in acute distress.     Appearance: He is well-developed.   HENT:      Head: Normocephalic and atraumatic.      Nose: Nose normal.   Eyes:      General: No scleral icterus.     Conjunctiva/sclera: Conjunctivae normal.      Pupils: Pupils are equal, round, and reactive to light.   Neck:      Thyroid: No thyromegaly.      Trachea: No tracheal deviation.    Cardiovascular:      Rate and Rhythm: Normal rate and regular rhythm.      Heart sounds: Normal heart sounds.   Pulmonary:      Effort: Pulmonary effort is normal. No respiratory distress.      Breath sounds: Normal breath sounds. No wheezing or rales.   Abdominal:      General: Bowel sounds are normal. There is no distension.      Palpations: Abdomen is soft.      Tenderness: There is no abdominal tenderness. There is no guarding or rebound.   Genitourinary:     Penis: Normal. No tenderness.       Prostate: Normal.   Musculoskeletal:         General: No deformity. Normal range of motion.      Cervical back: Neck supple.   Lymphadenopathy:      Cervical: No cervical adenopathy.   Skin:     General: Skin is warm and dry.      Findings: No erythema or rash.   Neurological:      Mental Status: He is alert and oriented to person, place, and time.      Cranial Nerves: No cranial nerve deficit.   Psychiatric:         Behavior: Behavior normal.         Assessment/Plan:     BPH:  We will continue the patient on Flomax and dutasteride    Prostate cancer screening patient's PSA is acceptable for his age. Follow up in 6 months with PSA due to family history    Erectile dysfunction will refill his Viagra as needed    I, Dr. Yeyo Myers have seen and personally evaluated the patient. I have formulated the plan reviewed all pertinent imaging and clinical data.  I agree with the nurse practitioner's assessment, and I have personally formulated the plan for this patient's care as described by the midlevel.  Problem List Items Addressed This Visit    None